# Patient Record
Sex: MALE | Race: WHITE | NOT HISPANIC OR LATINO | Employment: FULL TIME | URBAN - NONMETROPOLITAN AREA
[De-identification: names, ages, dates, MRNs, and addresses within clinical notes are randomized per-mention and may not be internally consistent; named-entity substitution may affect disease eponyms.]

---

## 2017-02-07 ENCOUNTER — HOSPITAL ENCOUNTER (EMERGENCY)
Facility: HOSPITAL | Age: 47
Discharge: HOME | End: 2017-02-07
Attending: EMERGENCY MEDICINE | Admitting: EMERGENCY MEDICINE

## 2017-02-07 DIAGNOSIS — J01.00 ACUTE MAXILLARY SINUSITIS, RECURRENCE NOT SPECIFIED: Primary | ICD-10-CM

## 2017-02-07 PROCEDURE — 99283 EMERGENCY DEPT VISIT LOW MDM: CPT

## 2017-02-07 RX ORDER — AMOXICILLIN AND CLAVULANATE POTASSIUM 875; 125 MG/1; MG/1
1 TABLET, FILM COATED ORAL ONCE
Status: COMPLETED | OUTPATIENT
Start: 2017-02-07 | End: 2017-02-07

## 2017-02-07 RX ORDER — CETIRIZINE HYDROCHLORIDE 10 MG/1
10 TABLET ORAL DAILY
Qty: 30 TABLET | Refills: 0 | Status: SHIPPED | OUTPATIENT
Start: 2017-02-07

## 2017-02-07 RX ORDER — AMOXICILLIN AND CLAVULANATE POTASSIUM 875; 125 MG/1; MG/1
1 TABLET, FILM COATED ORAL 2 TIMES DAILY
Qty: 20 TABLET | Refills: 0 | Status: SHIPPED | OUTPATIENT
Start: 2017-02-07 | End: 2017-02-17

## 2017-02-07 RX ORDER — LISINOPRIL 40 MG/1
40 TABLET ORAL DAILY
COMMUNITY

## 2017-02-07 RX ORDER — FLUTICASONE PROPIONATE 50 MCG
2 SPRAY, SUSPENSION (ML) NASAL DAILY
Qty: 1 EACH | Refills: 0 | Status: SHIPPED | OUTPATIENT
Start: 2017-02-07 | End: 2017-03-09

## 2017-02-07 RX ADMIN — AMOXICILLIN AND CLAVULANATE POTASSIUM 1 TABLET: 875; 125 TABLET, FILM COATED ORAL at 21:18

## 2017-02-08 VITALS
SYSTOLIC BLOOD PRESSURE: 148 MMHG | DIASTOLIC BLOOD PRESSURE: 87 MMHG | HEART RATE: 88 BPM | TEMPERATURE: 100.6 F | RESPIRATION RATE: 18 BRPM | HEIGHT: 72 IN | WEIGHT: 190 LBS | BODY MASS INDEX: 25.73 KG/M2 | OXYGEN SATURATION: 97 %

## 2017-02-08 NOTE — ED PROVIDER NOTES
Subjective   Patient is a 46 y.o. male presenting with URI.   History provided by:  Patient   used: No    URI   Presenting symptoms: congestion, facial pain, fever and rhinorrhea    Severity:  Moderate  Onset quality:  Sudden  Duration:  1 day  Timing:  Constant  Progression:  Worsening  Chronicity:  New  Relieved by:  Nothing  Worsened by:  Nothing  Ineffective treatments:  None tried  Associated symptoms: sinus pain        Review of Systems   Constitutional: Positive for fever.   HENT: Positive for congestion and rhinorrhea.    Eyes: Negative.    Respiratory: Negative.    Cardiovascular: Negative.    Gastrointestinal: Negative.    Endocrine: Negative.    Genitourinary: Negative.    Musculoskeletal: Negative.    Skin: Negative.    Allergic/Immunologic: Negative.    Neurological: Negative.    Hematological: Negative.    Psychiatric/Behavioral: Negative.    All other systems reviewed and are negative.      No past medical history on file.    Allergies   Allergen Reactions   • Bee Venom        No past surgical history on file.    No family history on file.    Social History     Social History   • Marital status: Unknown     Spouse name: N/A   • Number of children: N/A   • Years of education: N/A     Social History Main Topics   • Smoking status: Never Smoker   • Smokeless tobacco: Not on file   • Alcohol use No   • Drug use: No   • Sexual activity: Not on file     Other Topics Concern   • Not on file     Social History Narrative   • No narrative on file           Objective   Physical Exam   Constitutional: He is oriented to person, place, and time. He appears well-developed and well-nourished.   HENT:   Head: Normocephalic and atraumatic.   Right Ear: External ear normal.   Left Ear: External ear normal.   Nose: Rhinorrhea and sinus tenderness present. Right sinus exhibits maxillary sinus tenderness. Left sinus exhibits maxillary sinus tenderness.   Mouth/Throat: Oropharynx is clear and moist.    Eyes: EOM are normal. Pupils are equal, round, and reactive to light.   Neck: Normal range of motion. Neck supple.   Cardiovascular: Normal rate, regular rhythm, normal heart sounds and intact distal pulses.    Pulmonary/Chest: Effort normal and breath sounds normal.   Abdominal: Soft. Bowel sounds are normal.   Musculoskeletal: Normal range of motion.   Neurological: He is oriented to person, place, and time.   Skin: Skin is warm and dry.   Nursing note and vitals reviewed.      Procedures         ED Course  ED Course                  MDM    Final diagnoses:   Acute maxillary sinusitis, recurrence not specified            JARON Wright  02/07/17 4467

## 2017-10-14 ENCOUNTER — APPOINTMENT (OUTPATIENT)
Dept: CT IMAGING | Facility: HOSPITAL | Age: 47
End: 2017-10-14

## 2017-10-14 ENCOUNTER — DOCUMENTATION (OUTPATIENT)
Dept: SURGERY | Facility: CLINIC | Age: 47
End: 2017-10-14

## 2017-10-14 ENCOUNTER — HOSPITAL ENCOUNTER (EMERGENCY)
Facility: HOSPITAL | Age: 47
Discharge: HOME | End: 2017-10-14
Attending: FAMILY MEDICINE | Admitting: FAMILY MEDICINE

## 2017-10-14 VITALS
RESPIRATION RATE: 18 BRPM | HEIGHT: 72 IN | SYSTOLIC BLOOD PRESSURE: 121 MMHG | OXYGEN SATURATION: 100 % | DIASTOLIC BLOOD PRESSURE: 74 MMHG | BODY MASS INDEX: 27.09 KG/M2 | HEART RATE: 77 BPM | WEIGHT: 200 LBS | TEMPERATURE: 98 F

## 2017-10-14 DIAGNOSIS — Q43.0 MECKEL DIVERTICULUM: ICD-10-CM

## 2017-10-14 DIAGNOSIS — K57.32 SIGMOID DIVERTICULITIS: Primary | ICD-10-CM

## 2017-10-14 PROBLEM — K57.92 DIVERTICULITIS: Status: ACTIVE | Noted: 2017-10-14

## 2017-10-14 LAB
ALBUMIN SERPL-MCNC: 4.5 G/DL (ref 3.5–5)
ALBUMIN/GLOB SERPL: 1.3 G/DL (ref 1.5–2.5)
ALP SERPL-CCNC: 44 U/L (ref 40–129)
ALT SERPL W P-5'-P-CCNC: 13 U/L (ref 10–44)
ANION GAP SERPL CALCULATED.3IONS-SCNC: 6 MMOL/L (ref 3.6–11.2)
AST SERPL-CCNC: 23 U/L (ref 10–34)
BACTERIA UR QL AUTO: ABNORMAL /HPF
BASOPHILS # BLD AUTO: 0.01 10*3/MM3 (ref 0–0.3)
BASOPHILS NFR BLD AUTO: 0.1 % (ref 0–2)
BILIRUB SERPL-MCNC: 1.3 MG/DL (ref 0.2–1.8)
BILIRUB UR QL STRIP: NEGATIVE
BUN BLD-MCNC: 15 MG/DL (ref 7–21)
BUN/CREAT SERPL: 14.9 (ref 7–25)
CALCIUM SPEC-SCNC: 9.6 MG/DL (ref 7.7–10)
CHLORIDE SERPL-SCNC: 102 MMOL/L (ref 99–112)
CLARITY UR: CLEAR
CO2 SERPL-SCNC: 30 MMOL/L (ref 24.3–31.9)
COLOR UR: YELLOW
CREAT BLD-MCNC: 1.01 MG/DL (ref 0.43–1.29)
DEPRECATED RDW RBC AUTO: 43.3 FL (ref 37–54)
EOSINOPHIL # BLD AUTO: 0.13 10*3/MM3 (ref 0–0.7)
EOSINOPHIL NFR BLD AUTO: 1.1 % (ref 0–5)
ERYTHROCYTE [DISTWIDTH] IN BLOOD BY AUTOMATED COUNT: 13.5 % (ref 11.5–14.5)
GFR SERPL CREATININE-BSD FRML MDRD: 80 ML/MIN/1.73
GLOBULIN UR ELPH-MCNC: 3.5 GM/DL
GLUCOSE BLD-MCNC: 86 MG/DL (ref 70–110)
GLUCOSE UR STRIP-MCNC: NEGATIVE MG/DL
HCT VFR BLD AUTO: 43.4 % (ref 42–52)
HGB BLD-MCNC: 14.5 G/DL (ref 14–18)
HGB UR QL STRIP.AUTO: ABNORMAL
HOLD SPECIMEN: NORMAL
HOLD SPECIMEN: NORMAL
HYALINE CASTS UR QL AUTO: ABNORMAL /LPF
IMM GRANULOCYTES # BLD: 0.02 10*3/MM3 (ref 0–0.03)
IMM GRANULOCYTES NFR BLD: 0.2 % (ref 0–0.5)
KETONES UR QL STRIP: NEGATIVE
LEUKOCYTE ESTERASE UR QL STRIP.AUTO: ABNORMAL
LIPASE SERPL-CCNC: 39 U/L (ref 13–60)
LYMPHOCYTES # BLD AUTO: 1.99 10*3/MM3 (ref 1–3)
LYMPHOCYTES NFR BLD AUTO: 17.5 % (ref 21–51)
MCH RBC QN AUTO: 29.8 PG (ref 27–33)
MCHC RBC AUTO-ENTMCNC: 33.4 G/DL (ref 33–37)
MCV RBC AUTO: 89.1 FL (ref 80–94)
MONOCYTES # BLD AUTO: 1.38 10*3/MM3 (ref 0.1–0.9)
MONOCYTES NFR BLD AUTO: 12.2 % (ref 0–10)
NEUTROPHILS # BLD AUTO: 7.82 10*3/MM3 (ref 1.4–6.5)
NEUTROPHILS NFR BLD AUTO: 68.9 % (ref 30–70)
NITRITE UR QL STRIP: NEGATIVE
OSMOLALITY SERPL CALC.SUM OF ELEC: 275.8 MOSM/KG (ref 273–305)
PH UR STRIP.AUTO: 6 [PH] (ref 5–8)
PLATELET # BLD AUTO: 247 10*3/MM3 (ref 130–400)
PMV BLD AUTO: 9.7 FL (ref 6–10)
POTASSIUM BLD-SCNC: 4 MMOL/L (ref 3.5–5.3)
PROT SERPL-MCNC: 8 G/DL (ref 6–8)
PROT UR QL STRIP: NEGATIVE
RBC # BLD AUTO: 4.87 10*6/MM3 (ref 4.7–6.1)
RBC # UR: ABNORMAL /HPF
REF LAB TEST METHOD: ABNORMAL
SODIUM BLD-SCNC: 138 MMOL/L (ref 135–153)
SP GR UR STRIP: 1.01 (ref 1–1.03)
SQUAMOUS #/AREA URNS HPF: ABNORMAL /HPF
UROBILINOGEN UR QL STRIP: ABNORMAL
WBC NRBC COR # BLD: 11.35 10*3/MM3 (ref 4.5–12.5)
WBC UR QL AUTO: ABNORMAL /HPF
WHOLE BLOOD HOLD SPECIMEN: NORMAL
WHOLE BLOOD HOLD SPECIMEN: NORMAL

## 2017-10-14 PROCEDURE — 96375 TX/PRO/DX INJ NEW DRUG ADDON: CPT

## 2017-10-14 PROCEDURE — 25010000002 KETOROLAC TROMETHAMINE PER 15 MG: Performed by: FAMILY MEDICINE

## 2017-10-14 PROCEDURE — 83690 ASSAY OF LIPASE: CPT | Performed by: FAMILY MEDICINE

## 2017-10-14 PROCEDURE — 85025 COMPLETE CBC W/AUTO DIFF WBC: CPT | Performed by: FAMILY MEDICINE

## 2017-10-14 PROCEDURE — 99283 EMERGENCY DEPT VISIT LOW MDM: CPT

## 2017-10-14 PROCEDURE — 96365 THER/PROPH/DIAG IV INF INIT: CPT

## 2017-10-14 PROCEDURE — 25010000002 PIPERACILLIN-TAZOBACTAM: Performed by: FAMILY MEDICINE

## 2017-10-14 PROCEDURE — 81001 URINALYSIS AUTO W/SCOPE: CPT | Performed by: FAMILY MEDICINE

## 2017-10-14 PROCEDURE — 80053 COMPREHEN METABOLIC PANEL: CPT | Performed by: FAMILY MEDICINE

## 2017-10-14 PROCEDURE — 74176 CT ABD & PELVIS W/O CONTRAST: CPT

## 2017-10-14 PROCEDURE — 74176 CT ABD & PELVIS W/O CONTRAST: CPT | Performed by: RADIOLOGY

## 2017-10-14 RX ORDER — AMOXICILLIN AND CLAVULANATE POTASSIUM 875; 125 MG/1; MG/1
1 TABLET, FILM COATED ORAL 2 TIMES DAILY
Qty: 20 TABLET | Refills: 0 | Status: SHIPPED | OUTPATIENT
Start: 2017-10-14 | End: 2017-12-07

## 2017-10-14 RX ORDER — SODIUM CHLORIDE 0.9 % (FLUSH) 0.9 %
10 SYRINGE (ML) INJECTION AS NEEDED
Status: DISCONTINUED | OUTPATIENT
Start: 2017-10-14 | End: 2017-10-14 | Stop reason: HOSPADM

## 2017-10-14 RX ORDER — KETOROLAC TROMETHAMINE 30 MG/ML
15 INJECTION, SOLUTION INTRAMUSCULAR; INTRAVENOUS ONCE
Status: COMPLETED | OUTPATIENT
Start: 2017-10-14 | End: 2017-10-14

## 2017-10-14 RX ADMIN — PIPERACILLIN SODIUM AND TAZOBACTAM SODIUM 4.5 G: 4; .5 INJECTION, POWDER, FOR SOLUTION INTRAVENOUS at 14:52

## 2017-10-14 RX ADMIN — KETOROLAC TROMETHAMINE 15 MG: 30 INJECTION, SOLUTION INTRAMUSCULAR at 13:16

## 2017-10-14 NOTE — PROGRESS NOTES
Consultation note    Referring physician: Dr. Douglass ER physician    Consulting Physician: Dr. Peng Adams MD    Reason for consultation: diverticulitis  HPI:   Patient is a 46-year-old white male presents to emergency room with left lower quadrant abdominal pain which is been going on for 2 days.  He said his pain got so bad he felt he had to come to the emergency room.  No nausea or vomiting, no fever or chills.  A CT scan in the emergency room suggested diverticulitis and also a Meckel's diverticulum.  Patient has received antibiotics while in the emergency room in he says  he feels much better.  His white count is normal.      No past medical history on file.    No past surgical history on file.    No current facility-administered medications for this visit.     Current Outpatient Prescriptions:   •  amoxicillin-clavulanate (AUGMENTIN) 875-125 MG per tablet, Take 1 tablet by mouth 2 (Two) Times a Day., Disp: 20 tablet, Rfl: 0  •  cetirizine (zyrTEC) 10 MG tablet, Take 1 tablet by mouth Daily., Disp: 30 tablet, Rfl: 0  •  lisinopril (PRINIVIL,ZESTRIL) 40 MG tablet, Take 40 mg by mouth Daily., Disp: , Rfl:     Facility-Administered Medications Ordered in Other Visits:   •  sodium chloride 0.9 % flush 10 mL, 10 mL, Intravenous, PRN, Danny Douglass MD    Allergies   Allergen Reactions   • Bee Venom        Social History     Social History   • Marital status: Unknown     Spouse name: N/A   • Number of children: N/A   • Years of education: N/A     Occupational History   • Not on file.     Social History Main Topics   • Smoking status: Never Smoker   • Smokeless tobacco: Current User     Types: Chew   • Alcohol use No   • Drug use: No   • Sexual activity: Defer     Other Topics Concern   • Not on file     Social History Narrative   • No narrative on file       No family history on file.    ROS:   Constitutional: denies any weight changes, fatigue or weakness.  Eyes: : denies blurred or double  vision  Cardiovascular: denies chest pain, palpitations, edemas.  Respiratory: denies cough, sputum, SOB.  Gastrointestinal: denies N&V, abd pain, diarrhea, constipation.  Genitourinary: denies dysuria, frequency.  Endocrine: denies cold intolerance, lethargy and flushing.  Hem: denies excessive bruising and postop bleeding.  Musculoskeletal: denies weakness, joint swelling, pain or stiffness.  Neuro: denies seizures, CVA, paresthesia, or peripheral neuropathy.   Skin: denies change in nevi, rashes, masses.        Physical Exam:       General Appearance:    Alert, cooperative, in no acute distress   Head:    Normocephalic, without obvious abnormality, atraumatic   Eyes:            Lids and lashes normal, conjunctivae and sclerae normal, no   icterus, no pallor, corneas clear, PERRLA   Ears:    Ears appear intact with no abnormalities noted   Throat:   No oral lesions, no thrush, oral mucosa moist   Neck:   No adenopathy, supple, trachea midline, no thyromegaly, no   carotid bruit, no JVD   Back:     No kyphosis present, no scoliosis present, no skin lesions,      erythema or scars, no tenderness to percussion or                   palpation,   range of motion normal   Lungs:     Clear to auscultation,respirations regular, even and                  unlabored    Heart:    Regular rhythm and normal rate, normal S1 and S2, no            murmur, no gallop, no rub, no click   Chest Wall:    No abnormalities observed   Abdomen:   Mild tenderness left lower quadrant    Rectal:     Deferred   Extremities:   Moves all extremities well, no edema, no cyanosis, no             redness   Pulses:   Pulses palpable and equal bilaterally   Skin:   No bleeding, bruising or rash   Lymph nodes:   No palpable adenopathy   Neurologic:   Cranial nerves 2 - 12 grossly intact, sensation intact, DTR       present and equal bilaterally     There were no vitals filed for this visit.    Lab Results   Component Value Date    GLUCOSE 86 10/14/2017     BUN 15 10/14/2017    CREATININE 1.01 10/14/2017    EGFRIFNONA 80 10/14/2017    BCR 14.9 10/14/2017    CO2 30.0 10/14/2017    CALCIUM 9.6 10/14/2017    ALBUMIN 4.50 10/14/2017    LABIL2 1.3 (L) 10/14/2017    AST 23 10/14/2017    ALT 13 10/14/2017     WBC   Date Value Ref Range Status   10/14/2017 11.35 4.50 - 12.50 10*3/mm3 Final     RBC   Date Value Ref Range Status   10/14/2017 4.87 4.70 - 6.10 10*6/mm3 Final     Hemoglobin   Date Value Ref Range Status   10/14/2017 14.5 14.0 - 18.0 g/dL Final     Hematocrit   Date Value Ref Range Status   10/14/2017 43.4 42.0 - 52.0 % Final     MCV   Date Value Ref Range Status   10/14/2017 89.1 80.0 - 94.0 fL Final     MCH   Date Value Ref Range Status   10/14/2017 29.8 27.0 - 33.0 pg Final     MCHC   Date Value Ref Range Status   10/14/2017 33.4 33.0 - 37.0 g/dL Final     RDW   Date Value Ref Range Status   10/14/2017 13.5 11.5 - 14.5 % Final     RDW-SD   Date Value Ref Range Status   10/14/2017 43.3 37.0 - 54.0 fl Final     MPV   Date Value Ref Range Status   10/14/2017 9.7 6.0 - 10.0 fL Final     Platelets   Date Value Ref Range Status   10/14/2017 247 130 - 400 10*3/mm3 Final     Neutrophil %   Date Value Ref Range Status   10/14/2017 68.9 30.0 - 70.0 % Final     Lymphocyte %   Date Value Ref Range Status   10/14/2017 17.5 (L) 21.0 - 51.0 % Final     Monocyte %   Date Value Ref Range Status   10/14/2017 12.2 (H) 0.0 - 10.0 % Final     Eosinophil %   Date Value Ref Range Status   10/14/2017 1.1 0.0 - 5.0 % Final     Basophil %   Date Value Ref Range Status   10/14/2017 0.1 0.0 - 2.0 % Final     Immature Grans %   Date Value Ref Range Status   10/14/2017 0.2 0.0 - 0.5 % Final     Neutrophils, Absolute   Date Value Ref Range Status   10/14/2017 7.82 (H) 1.40 - 6.50 10*3/mm3 Final     Lymphocytes, Absolute   Date Value Ref Range Status   10/14/2017 1.99 1.00 - 3.00 10*3/mm3 Final     Monocytes, Absolute   Date Value Ref Range Status   10/14/2017 1.38 (H) 0.10 - 0.90 10*3/mm3  Final     Eosinophils, Absolute   Date Value Ref Range Status   10/14/2017 0.13 0.00 - 0.70 10*3/mm3 Final     Basophils, Absolute   Date Value Ref Range Status   10/14/2017 0.01 0.00 - 0.30 10*3/mm3 Final     Immature Grans, Absolute   Date Value Ref Range Status   10/14/2017 0.02 0.00 - 0.03 10*3/mm3 Final       Imaging Results (last 72 hours)     ** No results found for the last 72 hours. **            Assessment:   Acute diverticulitis with serendipitous finding of Meckel's diverticulum.  No evidence of abscess or free air    Plan:   I have recommended the patient stay for IV antibiotics however he would rather go home and try oral antibiotics which I think is reasonable at this point.  Dr. Douglass we'll write him a prescription for Cipro and Flagyl.  I instructed him to return to my office in one week or return to the emergency room sooner should he get worse.  Dragon disclaimer:  Much of this encounter note is an electronic transcription/translation of spoken language to printed text. The electronic translation of spoken language may permit erroneous, or at times, nonsensical words or phrases to be inadvertently transcribed; Although I have reviewed the note for such errors, some may still exist.

## 2017-10-14 NOTE — ED PROVIDER NOTES
Subjective   HPI Comments: 46-year-old male with a history of hypertension presents the emergency room with complaints of left lower quadrant abdominal pain that began 2 nights ago.  Patient states 2 days ago he was working as a  and felt that he overexerted himself he has had ongoing pain to his left lower quadrants that time.  He states pain is made worse with urination as well as with movement.  He denies any nausea vomiting or diarrhea.  He states that last night he had some chills with his symptoms.  He denies any sick contacts.  No discharge testicular pain or flank pain.  Patient denies any chest pain or shortness of breath.  Patient does report his had a mild headache with his symptoms as well.  He has taken no medication for relief of symptoms.  He denies recent travel outside the [place], he denies spoiled or undercooked foods.  He denies antibiotics in the last 90 days.    Patient is a 46 y.o. male presenting with abdominal pain.   Abdominal Pain   Pain location:  LLQ  Pain quality: aching and dull    Pain radiates to:  Does not radiate  Pain severity:  Moderate  Duration:  2 days  Timing:  Constant  Progression:  Unchanged  Chronicity:  New  Context: not diet changes, not eating, not medication withdrawal, not previous surgeries, not recent illness, not sick contacts and not trauma    Relieved by:  Nothing  Ineffective treatments:  None tried  Associated symptoms: chills and dysuria    Associated symptoms: no anorexia, no belching, no chest pain, no constipation, no cough, no diarrhea, no fatigue, no fever, no hematemesis, no hematochezia, no nausea, no shortness of breath, no sore throat and no vomiting        Review of Systems   Constitutional: Positive for chills. Negative for activity change, fatigue and fever.   HENT: Negative for congestion, sore throat and tinnitus.    Eyes: Negative for visual disturbance.   Respiratory: Negative for apnea, cough, shortness of breath, wheezing and  stridor.    Cardiovascular: Negative for chest pain.   Gastrointestinal: Positive for abdominal pain. Negative for anorexia, constipation, diarrhea, hematemesis, hematochezia, nausea and vomiting.   Genitourinary: Positive for dysuria. Negative for flank pain.   Musculoskeletal: Negative for arthralgias and myalgias.   Skin: Negative for rash.   Neurological: Negative for dizziness, weakness and light-headedness.   Hematological: Negative for adenopathy.   Psychiatric/Behavioral: Negative for agitation.       No past medical history on file.    Allergies   Allergen Reactions   • Bee Venom        No past surgical history on file.    No family history on file.    Social History     Social History   • Marital status: Unknown     Spouse name: N/A   • Number of children: N/A   • Years of education: N/A     Social History Main Topics   • Smoking status: Never Smoker   • Smokeless tobacco: Current User     Types: Chew   • Alcohol use No   • Drug use: No   • Sexual activity: Defer     Other Topics Concern   • Not on file     Social History Narrative   • No narrative on file           Objective   Physical Exam   Constitutional: No distress.   Nontoxic.  Comfortable appearing.   HENT:   Right Ear: External ear normal.   Left Ear: External ear normal.   Mouth/Throat: Oropharynx is clear and moist.   Moist mucous membranes.  Clear oropharynx.   Eyes: EOM are normal. Pupils are equal, round, and reactive to light.   Neck: Normal range of motion. Neck supple. No JVD present.   No carotid bruit.  No meningeal signs.   Cardiovascular: Normal rate and regular rhythm.   No extrasystoles are present.   Pulses:       Radial pulses are 2+ on the right side, and 2+ on the left side.        Dorsalis pedis pulses are 2+ on the right side, and 2+ on the left side.   Pulmonary/Chest: Effort normal and breath sounds normal. He has no decreased breath sounds. He has no wheezes. He has no rhonchi.   Abdominal: Soft. Bowel sounds are normal.  There is tenderness in the left lower quadrant. There is no rigidity, no rebound, no guarding and no CVA tenderness.   Genitourinary: Testes normal and penis normal.   Skin: Skin is warm and dry. He is not diaphoretic.   Nursing note and vitals reviewed.      Procedures         ED Course  ED Course                Patient given IV Toradol without any improvement in his pain.  Patient was noted to have CT scan of abdomen and pelvis concerning for sigmoid diverticulitis as well as Meckel's diverticulum.  Have given IV Zosyn.  Have contacted general surgery doctor Dr. Adams who states patient be medically hospital and he will evaluate while in the hospital.  Patient however social states she does not want stay in the hospital I discussed risk of death as well as an organ damage loss last out of refuses inpatient stay.  Patient verbalizes understanding.  Patient desires to discuss with surgery prior to admission to the hospital.  Have contacted natalee and notified him of patient's desires.    Patient evaluated by Dr. Adams who feels the patient can be discharged home and to follow-up as an outpatient setting.  Patient placed on Augmentin given that Cipro has a black box warning.  Patient is to follow-up in discussed warning signs and symptoms or return emergency room.  Patient verbalizes understanding.  Patient is nontoxic appearing and stable prior to discharge.  MDM    Final diagnoses:   Sigmoid diverticulitis            Danny Douglass MD  10/17/17 0356

## 2017-10-14 NOTE — ED NOTES
Pt sitting up in bed, awake and alert, respirations nonlabored and even, denies pain 0/10 or any need.     Jonelle Addison RN  10/14/17 9933

## 2017-10-19 ENCOUNTER — OFFICE VISIT (OUTPATIENT)
Dept: SURGERY | Facility: CLINIC | Age: 47
End: 2017-10-19

## 2017-10-19 VITALS
HEART RATE: 49 BPM | WEIGHT: 200.6 LBS | HEIGHT: 72 IN | DIASTOLIC BLOOD PRESSURE: 103 MMHG | SYSTOLIC BLOOD PRESSURE: 160 MMHG | TEMPERATURE: 97.4 F | BODY MASS INDEX: 27.17 KG/M2 | RESPIRATION RATE: 17 BRPM

## 2017-10-19 DIAGNOSIS — K57.32 DIVERTICULITIS OF LARGE INTESTINE WITHOUT PERFORATION OR ABSCESS WITHOUT BLEEDING: Primary | ICD-10-CM

## 2017-10-19 PROCEDURE — 99213 OFFICE O/P EST LOW 20 MIN: CPT | Performed by: SURGERY

## 2017-10-19 NOTE — PROGRESS NOTES
10/19/2017    Patient Information  Antonia Mckeon  [address]  [place] [address]  1970  [phone] (home)     Chief Complaint   Patient presents with   • Follow-up     Hospital FU Diverticulitis       HPI  Patient's 46-year-old white male here for follow-up on outpatient treatment of diverticulitis.  He came to emergency room 5 days ago.  CT scan showed evidence of diverticulitis.  He's been treated with Cipro and Flagyl since then he says he is markedly better.  CT scan also showed evidence of a Meckel's diverticulum.    Review of Systems:  The Review of Systems has been reviewed and confirmed.    General: fever, chills  Integumentary: negative  Eyes: glasses  ENT: negative  Respiratory: negative  Gastrointestinal: heartburn, diverticulosis, diarrhea and abdominal pain  Cardiovascular: negative  Neurological: negative  Psychiatric: negative  Hematologic/Lymphatic: negative  Genitourinary: painful urination  Musculoskeletal: negative  Endocrine: negative  Breasts: negative      Patient Active Problem List   Diagnosis   • Diverticulitis         Past Medical History:   Diagnosis Date   • Diverticulitis    • Hypertension          No past surgical history on file.      Family History   Problem Relation Age of Onset   • No Known Problems Mother    • No Known Problems Father          Social History   Substance Use Topics   • Smoking status: Never Smoker   • Smokeless tobacco: Current User     Types: Chew   • Alcohol use No       Current Outpatient Prescriptions   Medication Sig Dispense Refill   • amoxicillin-clavulanate (AUGMENTIN) 875-125 MG per tablet Take 1 tablet by mouth 2 (Two) Times a Day. 20 tablet 0   • cetirizine (zyrTEC) 10 MG tablet Take 1 tablet by mouth Daily. 30 tablet 0   • lisinopril (PRINIVIL,ZESTRIL) 40 MG tablet Take 40 mg by mouth Daily.       No current facility-administered medications for this visit.          Allergies  Bee venom      Physical Exam  Gen. 46-year-old white male  "stress  Abdomen soft nontender    BP (!) 160/103  Pulse (!) 49  Temp 97.4 °F (36.3 °C)  Resp 17  Ht 72\" (182.9 cm)  Wt 200 lb 9.6 oz (91 kg)  BMI 27.21 kg/m2        ASSESSMENT  Resolving diverticulitis on outpatient antibiotic treatment.  Meckel's diverticulum        PLAN    Finish his antibiotics.  Return for 1 month be scheduled for colonoscopy.        Peng Adams MD    "

## 2017-11-21 ENCOUNTER — OFFICE VISIT (OUTPATIENT)
Dept: SURGERY | Facility: CLINIC | Age: 47
End: 2017-11-21

## 2017-11-21 VITALS
DIASTOLIC BLOOD PRESSURE: 80 MMHG | WEIGHT: 200 LBS | HEIGHT: 72 IN | SYSTOLIC BLOOD PRESSURE: 135 MMHG | BODY MASS INDEX: 27.09 KG/M2

## 2017-11-21 DIAGNOSIS — K57.32 DIVERTICULITIS OF LARGE INTESTINE WITHOUT PERFORATION OR ABSCESS WITHOUT BLEEDING: Primary | ICD-10-CM

## 2017-11-21 PROCEDURE — 99213 OFFICE O/P EST LOW 20 MIN: CPT | Performed by: SURGERY

## 2017-11-21 NOTE — PROGRESS NOTES
"11/21/2017    Patient Information  Antonia Mckeon  [address]  [place] [address]  1970  [phone] (home)     Chief Complaint   Patient presents with   • Follow-up     Diverticulitis       HPI  Patient is a 47-year-old white male here for follow-up on outpatient treatment of diverticulitis.  He was treated with oral antibiotics through the emergency room after an episode of diverticulitis 5 weeks ago.  He says he is doing fine now.  He does have heartburn and diverticulosis, diarrhea and abdominal pain.    Review of Systems:  The Review of Systems has been reviewed and confirmed.    General: fever, chills  Integumentary: negative  Eyes: glasses  ENT: negative  Respiratory: negative  Gastrointestinal: heartburn, diverticulosis, diarrhea and abdominal pain  Cardiovascular: negative  Neurological: negative  Psychiatric: negative  Hematologic/Lymphatic: negative  Genitourinary: painful urination  Musculoskeletal: negative  Endocrine: negative  Breasts: negative    Patient Active Problem List   Diagnosis   • Diverticulitis         Past Medical History:   Diagnosis Date   • Diverticulitis    • Hypertension          History reviewed. No pertinent surgical history.      Family History   Problem Relation Age of Onset   • No Known Problems Mother    • No Known Problems Father          Social History   Substance Use Topics   • Smoking status: Never Smoker   • Smokeless tobacco: Current User     Types: Chew   • Alcohol use No       Current Outpatient Prescriptions   Medication Sig Dispense Refill   • lisinopril (PRINIVIL,ZESTRIL) 40 MG tablet Take 40 mg by mouth Daily.     • amoxicillin-clavulanate (AUGMENTIN) 875-125 MG per tablet Take 1 tablet by mouth 2 (Two) Times a Day. 20 tablet 0   • cetirizine (zyrTEC) 10 MG tablet Take 1 tablet by mouth Daily. 30 tablet 0     No current facility-administered medications for this visit.          Allergies  Bee venom    /80  Ht 72\" (182.9 cm)  Wt 200 lb (90.7 kg) "  BMI 27.12 kg/m2     Physical Exam   Constitutional: He is oriented to person, place, and time. He appears well-developed and well-nourished. No distress.   HENT:   Head: Normocephalic.   Right Ear: External ear normal.   Left Ear: External ear normal.   Nose: Nose normal.   Mouth/Throat: Oropharynx is clear and moist.   Eyes: Conjunctivae and EOM are normal. Right eye exhibits no discharge. Left eye exhibits no discharge.   Neck: Normal range of motion. No JVD present. No tracheal deviation present. No thyromegaly present.   Cardiovascular: Normal rate, regular rhythm, normal heart sounds and intact distal pulses.  Exam reveals no gallop and no friction rub.    No murmur heard.  Pulmonary/Chest: Effort normal and breath sounds normal. No stridor. No respiratory distress. He has no wheezes. He has no rales. He exhibits no tenderness.   Abdominal: Soft. Bowel sounds are normal. He exhibits no distension and no mass. There is no tenderness. There is no rebound and no guarding. No hernia.   Genitourinary: Rectal exam shows guaiac negative stool.   Musculoskeletal: Normal range of motion. He exhibits no edema, tenderness or deformity.   Lymphadenopathy:     He has no cervical adenopathy.   Neurological: He is alert and oriented to person, place, and time. He has normal reflexes. He displays normal reflexes. No cranial nerve deficit. He exhibits normal muscle tone. Coordination normal.   Skin: Skin is warm and dry. No rash noted. He is not diaphoretic. No erythema. No pallor.   Psychiatric: He has a normal mood and affect. His behavior is normal. Judgment and thought content normal.   Nursing note and vitals reviewed.                ASSESSMENT  Resolving diverticulitis        PLAN    Colonoscopy        Peng Adams MD

## 2017-11-22 PROBLEM — K57.32 DIVERTICULITIS OF LARGE INTESTINE WITHOUT PERFORATION OR ABSCESS WITHOUT BLEEDING: Status: ACTIVE | Noted: 2017-11-22

## 2017-12-07 ENCOUNTER — TELEPHONE (OUTPATIENT)
Dept: SURGERY | Facility: CLINIC | Age: 47
End: 2017-12-07

## 2017-12-08 ENCOUNTER — HOSPITAL ENCOUNTER (OUTPATIENT)
Facility: HOSPITAL | Age: 47
Setting detail: HOSPITAL OUTPATIENT SURGERY
Discharge: HOME | End: 2017-12-08
Attending: SURGERY | Admitting: SURGERY

## 2017-12-08 ENCOUNTER — ANESTHESIA (OUTPATIENT)
Dept: PERIOP | Facility: HOSPITAL | Age: 47
End: 2017-12-08

## 2017-12-08 ENCOUNTER — ANESTHESIA EVENT (OUTPATIENT)
Dept: PERIOP | Facility: HOSPITAL | Age: 47
End: 2017-12-08

## 2017-12-08 VITALS
TEMPERATURE: 98.2 F | WEIGHT: 200 LBS | HEART RATE: 67 BPM | HEIGHT: 69 IN | DIASTOLIC BLOOD PRESSURE: 82 MMHG | OXYGEN SATURATION: 96 % | RESPIRATION RATE: 18 BRPM | SYSTOLIC BLOOD PRESSURE: 116 MMHG | BODY MASS INDEX: 29.62 KG/M2

## 2017-12-08 PROCEDURE — 45378 DIAGNOSTIC COLONOSCOPY: CPT | Performed by: SURGERY

## 2017-12-08 PROCEDURE — 25010000002 MIDAZOLAM PER 1 MG: Performed by: NURSE ANESTHETIST, CERTIFIED REGISTERED

## 2017-12-08 PROCEDURE — 25010000002 PROPOFOL 1000 MG/ML EMULSION: Performed by: NURSE ANESTHETIST, CERTIFIED REGISTERED

## 2017-12-08 PROCEDURE — 25010000002 FENTANYL CITRATE (PF) 100 MCG/2ML SOLUTION: Performed by: NURSE ANESTHETIST, CERTIFIED REGISTERED

## 2017-12-08 PROCEDURE — 25010000002 PROPOFOL 10 MG/ML EMULSION: Performed by: NURSE ANESTHETIST, CERTIFIED REGISTERED

## 2017-12-08 RX ORDER — SODIUM CHLORIDE, SODIUM LACTATE, POTASSIUM CHLORIDE, CALCIUM CHLORIDE 600; 310; 30; 20 MG/100ML; MG/100ML; MG/100ML; MG/100ML
125 INJECTION, SOLUTION INTRAVENOUS CONTINUOUS
Status: DISCONTINUED | OUTPATIENT
Start: 2017-12-08 | End: 2017-12-08 | Stop reason: HOSPADM

## 2017-12-08 RX ORDER — ONDANSETRON 2 MG/ML
4 INJECTION INTRAMUSCULAR; INTRAVENOUS ONCE AS NEEDED
Status: DISCONTINUED | OUTPATIENT
Start: 2017-12-08 | End: 2017-12-08 | Stop reason: HOSPADM

## 2017-12-08 RX ORDER — FENTANYL CITRATE 50 UG/ML
50 INJECTION, SOLUTION INTRAMUSCULAR; INTRAVENOUS
Status: DISCONTINUED | OUTPATIENT
Start: 2017-12-08 | End: 2017-12-08 | Stop reason: HOSPADM

## 2017-12-08 RX ORDER — IPRATROPIUM BROMIDE AND ALBUTEROL SULFATE 2.5; .5 MG/3ML; MG/3ML
3 SOLUTION RESPIRATORY (INHALATION) ONCE AS NEEDED
Status: DISCONTINUED | OUTPATIENT
Start: 2017-12-08 | End: 2017-12-08 | Stop reason: HOSPADM

## 2017-12-08 RX ORDER — OXYCODONE HYDROCHLORIDE AND ACETAMINOPHEN 5; 325 MG/1; MG/1
1 TABLET ORAL ONCE AS NEEDED
Status: DISCONTINUED | OUTPATIENT
Start: 2017-12-08 | End: 2017-12-08 | Stop reason: HOSPADM

## 2017-12-08 RX ORDER — LIDOCAINE HYDROCHLORIDE 20 MG/ML
INJECTION, SOLUTION INFILTRATION; PERINEURAL AS NEEDED
Status: DISCONTINUED | OUTPATIENT
Start: 2017-12-08 | End: 2017-12-08 | Stop reason: SURG

## 2017-12-08 RX ORDER — MEPERIDINE HYDROCHLORIDE 25 MG/ML
12.5 INJECTION INTRAMUSCULAR; INTRAVENOUS; SUBCUTANEOUS
Status: DISCONTINUED | OUTPATIENT
Start: 2017-12-08 | End: 2017-12-08 | Stop reason: HOSPADM

## 2017-12-08 RX ORDER — FENTANYL CITRATE 50 UG/ML
INJECTION, SOLUTION INTRAMUSCULAR; INTRAVENOUS AS NEEDED
Status: DISCONTINUED | OUTPATIENT
Start: 2017-12-08 | End: 2017-12-08 | Stop reason: SURG

## 2017-12-08 RX ORDER — MIDAZOLAM HYDROCHLORIDE 1 MG/ML
INJECTION INTRAMUSCULAR; INTRAVENOUS AS NEEDED
Status: DISCONTINUED | OUTPATIENT
Start: 2017-12-08 | End: 2017-12-08 | Stop reason: SURG

## 2017-12-08 RX ORDER — PROPOFOL 10 MG/ML
VIAL (ML) INTRAVENOUS AS NEEDED
Status: DISCONTINUED | OUTPATIENT
Start: 2017-12-08 | End: 2017-12-08 | Stop reason: SURG

## 2017-12-08 RX ORDER — SODIUM CHLORIDE 0.9 % (FLUSH) 0.9 %
1-10 SYRINGE (ML) INJECTION AS NEEDED
Status: DISCONTINUED | OUTPATIENT
Start: 2017-12-08 | End: 2017-12-08 | Stop reason: HOSPADM

## 2017-12-08 RX ADMIN — FENTANYL CITRATE 100 MCG: 50 INJECTION INTRAMUSCULAR; INTRAVENOUS at 07:48

## 2017-12-08 RX ADMIN — MIDAZOLAM HYDROCHLORIDE 2 MG: 1 INJECTION, SOLUTION INTRAMUSCULAR; INTRAVENOUS at 07:48

## 2017-12-08 RX ADMIN — LIDOCAINE HYDROCHLORIDE 60 MG: 20 INJECTION, SOLUTION INFILTRATION; PERINEURAL at 07:52

## 2017-12-08 RX ADMIN — PROPOFOL 50 MG: 10 INJECTION, EMULSION INTRAVENOUS at 07:52

## 2017-12-08 RX ADMIN — PROPOFOL 160 MCG/KG/MIN: 10 INJECTION, EMULSION INTRAVENOUS at 07:52

## 2017-12-08 RX ADMIN — SODIUM CHLORIDE, POTASSIUM CHLORIDE, SODIUM LACTATE AND CALCIUM CHLORIDE: 600; 310; 30; 20 INJECTION, SOLUTION INTRAVENOUS at 07:48

## 2017-12-08 NOTE — OP NOTE
Antonia Mckeon  12/8/2017      Operative Progress Note:   Surgeon and Assistant: Dr. Adams     Pre-Operative Diagnosis: resolved diverticulitis     Post-Operative Diagnosis: resolved diverticulitis normal colonoscopy     Procedure(s):  colonoscopy to cecum     Type of Anesthesia Administered: IV general Estimated Blood Loss: Minimal     Blood Products: None     Specimen Obtained/Removed:None     Complication(s):  None     Graft/Implant/Prosthetics/Implanted Device/Transplants: None    Indication: patient is a 47-year-old white male whose had a previous episode of diverticulitis     Findings: colon normal in its entirety.  Good prep     Operative Report:  Patient was taken to the operating room and placed in a left lateral decubitus position.  IV sedation was given.  Colonoscope was introduced and passed all the way to cecum.  The scope was slowly withdrawn.  Cecum, ascending colon, hepatic flexure, transverse colon, splenic flexure, descending colon, sigmoid colon, and rectum were all normal.  Digital rectal exam was unremarkable.  The procedures and terminated.  Patient tolerated procedure very well and was returned recovery room in satisfactory condition    Patient be discharged home at recovery and will be seen back in the office in one week.       Electronically Signed by: MD Darrell Cat disclaimer:  Much of this encounter note is an electronic transcription/translation of spoken language to printed text. The electronic translation of spoken language may permit erroneous, or at times, nonsensical words or phrases to be inadvertently transcribed; Although I have reviewed the note for such errors, some may still exist.

## 2017-12-08 NOTE — ANESTHESIA PREPROCEDURE EVALUATION
Anesthesia Evaluation     history of anesthetic complications:  NPO Solid Status: > 8 hours  NPO Liquid Status: > 8 hours     Airway   Mallampati: II  TM distance: >3 FB  Neck ROM: full  no difficulty expected  Dental    (+) poor dentition    Pulmonary - normal exam   Cardiovascular - normal exam    (+) hypertension,       Neuro/Psych  GI/Hepatic/Renal/Endo      Musculoskeletal     Abdominal  - normal exam   Substance History      OB/GYN          Other                                        Anesthesia Plan    ASA 2     general     intravenous induction   Anesthetic plan and risks discussed with patient.

## 2017-12-08 NOTE — PLAN OF CARE
Problem: GI Endoscopy (Adult)  Goal: Signs and Symptoms of Listed Potential Problems Will be Absent or Manageable (GI Endoscopy)  Outcome: Ongoing (interventions implemented as appropriate)    12/08/17 0643   GI Endoscopy   Problems Assessed (GI Endoscopy) all   Problems Present (GI Endoscopy) none

## 2017-12-08 NOTE — ANESTHESIA POSTPROCEDURE EVALUATION
Patient: Antonia Mckeon    Procedure Summary     Date Anesthesia Start Anesthesia Stop Room / Location    12/08/17 0748 0803  [facility] / [facility]       Procedure Diagnosis Surgeon Provider    COLONOSCOPY (N/A ) Diverticulitis of large intestine without perforation or abscess without bleeding  (Diverticulitis of large intestine without perforation or abscess without bleeding [K57.32]) MD Lino Friedman MD          Anesthesia Type: general  Last vitals  BP   102/70 (12/08/17 0820)   Temp   98.2 °F (36.8 °C) (12/08/17 0805)   Pulse   61 (12/08/17 0820)   Resp   18 (12/08/17 0820)     SpO2   96 % (12/08/17 0820)     Post Anesthesia Care and Evaluation    Patient location during evaluation: bedside  Patient participation: complete - patient participated  Level of consciousness: awake and alert  Pain score: 1  Pain management: adequate  Airway patency: patent  Anesthetic complications: No anesthetic complications  PONV Status: none  Cardiovascular status: acceptable  Respiratory status: acceptable  Hydration status: acceptable

## 2017-12-08 NOTE — H&P (VIEW-ONLY)
"11/21/2017    Patient Information  Antonia Mckeon  [address]  [place] [address]  1970  [phone] (home)     Chief Complaint   Patient presents with   • Follow-up     Diverticulitis       HPI  Patient is a 47-year-old white male here for follow-up on outpatient treatment of diverticulitis.  He was treated with oral antibiotics through the emergency room after an episode of diverticulitis 5 weeks ago.  He says he is doing fine now.  He does have heartburn and diverticulosis, diarrhea and abdominal pain.    Review of Systems:  The Review of Systems has been reviewed and confirmed.    General: fever, chills  Integumentary: negative  Eyes: glasses  ENT: negative  Respiratory: negative  Gastrointestinal: heartburn, diverticulosis, diarrhea and abdominal pain  Cardiovascular: negative  Neurological: negative  Psychiatric: negative  Hematologic/Lymphatic: negative  Genitourinary: painful urination  Musculoskeletal: negative  Endocrine: negative  Breasts: negative    Patient Active Problem List   Diagnosis   • Diverticulitis         Past Medical History:   Diagnosis Date   • Diverticulitis    • Hypertension          History reviewed. No pertinent surgical history.      Family History   Problem Relation Age of Onset   • No Known Problems Mother    • No Known Problems Father          Social History   Substance Use Topics   • Smoking status: Never Smoker   • Smokeless tobacco: Current User     Types: Chew   • Alcohol use No       Current Outpatient Prescriptions   Medication Sig Dispense Refill   • lisinopril (PRINIVIL,ZESTRIL) 40 MG tablet Take 40 mg by mouth Daily.     • amoxicillin-clavulanate (AUGMENTIN) 875-125 MG per tablet Take 1 tablet by mouth 2 (Two) Times a Day. 20 tablet 0   • cetirizine (zyrTEC) 10 MG tablet Take 1 tablet by mouth Daily. 30 tablet 0     No current facility-administered medications for this visit.          Allergies  Bee venom    /80  Ht 72\" (182.9 cm)  Wt 200 lb (90.7 kg) "  BMI 27.12 kg/m2     Physical Exam   Constitutional: He is oriented to person, place, and time. He appears well-developed and well-nourished. No distress.   HENT:   Head: Normocephalic.   Right Ear: External ear normal.   Left Ear: External ear normal.   Nose: Nose normal.   Mouth/Throat: Oropharynx is clear and moist.   Eyes: Conjunctivae and EOM are normal. Right eye exhibits no discharge. Left eye exhibits no discharge.   Neck: Normal range of motion. No JVD present. No tracheal deviation present. No thyromegaly present.   Cardiovascular: Normal rate, regular rhythm, normal heart sounds and intact distal pulses.  Exam reveals no gallop and no friction rub.    No murmur heard.  Pulmonary/Chest: Effort normal and breath sounds normal. No stridor. No respiratory distress. He has no wheezes. He has no rales. He exhibits no tenderness.   Abdominal: Soft. Bowel sounds are normal. He exhibits no distension and no mass. There is no tenderness. There is no rebound and no guarding. No hernia.   Genitourinary: Rectal exam shows guaiac negative stool.   Musculoskeletal: Normal range of motion. He exhibits no edema, tenderness or deformity.   Lymphadenopathy:     He has no cervical adenopathy.   Neurological: He is alert and oriented to person, place, and time. He has normal reflexes. He displays normal reflexes. No cranial nerve deficit. He exhibits normal muscle tone. Coordination normal.   Skin: Skin is warm and dry. No rash noted. He is not diaphoretic. No erythema. No pallor.   Psychiatric: He has a normal mood and affect. His behavior is normal. Judgment and thought content normal.   Nursing note and vitals reviewed.                ASSESSMENT  Resolving diverticulitis        PLAN    Colonoscopy        Peng Adams MD

## 2018-09-03 ENCOUNTER — HOSPITAL ENCOUNTER (EMERGENCY)
Facility: HOSPITAL | Age: 48
Discharge: HOME | End: 2018-09-03
Attending: EMERGENCY MEDICINE | Admitting: EMERGENCY MEDICINE

## 2018-09-03 VITALS
BODY MASS INDEX: 27.09 KG/M2 | WEIGHT: 200 LBS | DIASTOLIC BLOOD PRESSURE: 88 MMHG | OXYGEN SATURATION: 98 % | TEMPERATURE: 97.7 F | RESPIRATION RATE: 17 BRPM | HEIGHT: 72 IN | HEART RATE: 88 BPM | SYSTOLIC BLOOD PRESSURE: 141 MMHG

## 2018-09-03 DIAGNOSIS — T63.461A YELLOW JACKET STING, ACCIDENTAL OR UNINTENTIONAL, INITIAL ENCOUNTER: Primary | ICD-10-CM

## 2018-09-03 PROCEDURE — 25010000002 METHYLPREDNISOLONE PER 125 MG: Performed by: PHYSICIAN ASSISTANT

## 2018-09-03 PROCEDURE — 96372 THER/PROPH/DIAG INJ SC/IM: CPT

## 2018-09-03 PROCEDURE — 99283 EMERGENCY DEPT VISIT LOW MDM: CPT

## 2018-09-03 PROCEDURE — 25010000002 DIPHENHYDRAMINE PER 50 MG: Performed by: PHYSICIAN ASSISTANT

## 2018-09-03 RX ORDER — DIPHENHYDRAMINE HCL 25 MG
25 CAPSULE ORAL EVERY 6 HOURS PRN
Qty: 60 CAPSULE | Refills: 0 | Status: SHIPPED | OUTPATIENT
Start: 2018-09-03

## 2018-09-03 RX ORDER — PREDNISONE 20 MG/1
20 TABLET ORAL DAILY
Qty: 5 TABLET | Refills: 0 | Status: SHIPPED | OUTPATIENT
Start: 2018-09-03

## 2018-09-03 RX ORDER — METHYLPREDNISOLONE SODIUM SUCCINATE 125 MG/2ML
125 INJECTION, POWDER, LYOPHILIZED, FOR SOLUTION INTRAMUSCULAR; INTRAVENOUS ONCE
Status: COMPLETED | OUTPATIENT
Start: 2018-09-03 | End: 2018-09-03

## 2018-09-03 RX ORDER — DIPHENHYDRAMINE HYDROCHLORIDE 50 MG/ML
50 INJECTION INTRAMUSCULAR; INTRAVENOUS EVERY 6 HOURS PRN
Status: DISCONTINUED | OUTPATIENT
Start: 2018-09-03 | End: 2018-09-03 | Stop reason: HOSPADM

## 2018-09-03 RX ADMIN — METHYLPREDNISOLONE SODIUM SUCCINATE 125 MG: 125 INJECTION, POWDER, FOR SOLUTION INTRAMUSCULAR; INTRAVENOUS at 11:26

## 2018-09-03 RX ADMIN — DIPHENHYDRAMINE HYDROCHLORIDE 50 MG: 50 INJECTION, SOLUTION INTRAMUSCULAR; INTRAVENOUS at 11:26

## 2018-09-03 NOTE — ED PROVIDER NOTES
"Subjective   This is a 47-year-old male comes in with chief complaint \"yellowjacket sting \"just prior to arrival.  Patient states that he was weed eating when he asked on right arm and 2 yellowjacket.  Patient does have history of being allergic to them.  Does state he had localized swelling.  Denies any other associated symptoms at the time.        History provided by:  Patient   used: No    Insect Bite   Contact animal:  Insect  Location:  Shoulder/arm  Shoulder/arm injury location:  R upper arm  Time since incident:  1 day  Pain details:     Quality:  Aching and localized    Severity:  Moderate    Timing:  Intermittent    Progression:  Worsening  Incident location:  Home  Provoked: unprovoked    Notifications:  None  Animal's rabies vaccination status:  Unknown  Animal in possession: no    Tetanus status:  Unknown  Relieved by:  Nothing  Worsened by:  Nothing  Associated symptoms: rash and swelling    Associated symptoms: no fever        Review of Systems   Constitutional: Negative for fever.   Respiratory: Negative for apnea, cough, choking, chest tightness and shortness of breath.    Genitourinary: Negative for dysuria, enuresis, flank pain, frequency and genital sores.   Skin: Positive for rash.   All other systems reviewed and are negative.      Past Medical History:   Diagnosis Date   • Diverticulitis    • Hypertension        Allergies   Allergen Reactions   • Bee Venom        Past Surgical History:   Procedure Laterality Date   • COLONOSCOPY N/A 12/8/2017    Procedure: COLONOSCOPY;  Surgeon: Peng Adams MD;  Location: [facility];  Service:        Family History   Problem Relation Age of Onset   • No Known Problems Mother    • No Known Problems Father        Social History     Social History   • Marital status: Legally      Social History Main Topics   • Smoking status: Never Smoker   • Smokeless tobacco: Current User     Types: Chew   • Alcohol use Yes      Comment: " Occasional    • Drug use: No   • Sexual activity: Defer     Other Topics Concern   • Not on file           Objective   Physical Exam   Constitutional: He is oriented to person, place, and time. He appears well-developed and well-nourished. No distress.   HENT:   Head: Normocephalic.   Right Ear: External ear normal.   Left Ear: External ear normal.   Nose: Nose normal.   Mouth/Throat: Oropharynx is clear and moist. No oropharyngeal exudate.   Eyes: Pupils are equal, round, and reactive to light. Conjunctivae and EOM are normal. Right eye exhibits no discharge. Left eye exhibits no discharge. No scleral icterus.   Neck: Normal range of motion. Neck supple. No JVD present. No tracheal deviation present. No thyromegaly present.   Cardiovascular: Normal rate, regular rhythm, normal heart sounds and intact distal pulses.  Exam reveals no friction rub.    No murmur heard.  Pulmonary/Chest: Effort normal and breath sounds normal. No stridor. No respiratory distress. He has no wheezes. He has no rales. He exhibits no tenderness.   Abdominal: Soft. Bowel sounds are normal. He exhibits no distension and no mass. There is no tenderness. There is no rebound and no guarding. No hernia.   Musculoskeletal: Normal range of motion. He exhibits no edema, tenderness or deformity.   Neurological: He is alert and oriented to person, place, and time. He displays normal reflexes. No cranial nerve deficit or sensory deficit. He exhibits normal muscle tone. Coordination normal.   Skin: Skin is warm and dry. Capillary refill takes less than 2 seconds. Rash noted. He is not diaphoretic. There is erythema. No pallor.        Psychiatric: He has a normal mood and affect. His behavior is normal. Judgment and thought content normal.   Nursing note and vitals reviewed.      Procedures           ED Course                  MDM      Final diagnoses:   Yellow jacket sting, accidental or unintentional, initial encounter            William Zuñiga,  MELVIN  09/03/18 1230

## 2018-09-03 NOTE — ED NOTES
Patient reports relief with medications; no reaction noted; will continue to monitor patient for any changes.     Sarba Bertrand RN  09/03/18 6130

## 2018-09-03 NOTE — ED NOTES
Pt refused IM shots and demanded that med be given IV. Doc notified and meds given per patient's demand     Phillip Almanza RN  09/03/18 5603

## (undated) DEVICE — CONN Y IRR DISP 1P/U

## (undated) DEVICE — GOWN,REINF,POLY,ECL,PP SLV,XL: Brand: MEDLINE

## (undated) DEVICE — Device: Brand: ENDOGATOR

## (undated) DEVICE — SYR LUERLOK 30CC

## (undated) DEVICE — Device

## (undated) DEVICE — TUBING, SUCTION, 1/4" X 20', STRAIGHT: Brand: MEDLINE INDUSTRIES, INC.

## (undated) DEVICE — SINGLE PORT MANIFOLD: Brand: NEPTUNE 2

## (undated) DEVICE — Device: Brand: DEFENDO AIR/WATER/SUCTION AND BIOPSY VALVE

## (undated) DEVICE — SUCTION CANISTER, 1500CC, RIGID: Brand: DEROYAL